# Patient Record
Sex: FEMALE | Race: WHITE | Employment: FULL TIME | URBAN - METROPOLITAN AREA
[De-identification: names, ages, dates, MRNs, and addresses within clinical notes are randomized per-mention and may not be internally consistent; named-entity substitution may affect disease eponyms.]

---

## 2022-03-12 ENCOUNTER — OFFICE VISIT (OUTPATIENT)
Dept: URGENT CARE | Facility: URGENT CARE | Age: 37
End: 2022-03-12

## 2022-03-12 VITALS
HEART RATE: 75 BPM | TEMPERATURE: 99.7 F | WEIGHT: 135 LBS | SYSTOLIC BLOOD PRESSURE: 151 MMHG | OXYGEN SATURATION: 99 % | DIASTOLIC BLOOD PRESSURE: 78 MMHG | RESPIRATION RATE: 16 BRPM

## 2022-03-12 DIAGNOSIS — S61.214A LACERATION OF RIGHT RING FINGER WITHOUT FOREIGN BODY WITHOUT DAMAGE TO NAIL, INITIAL ENCOUNTER: Primary | ICD-10-CM

## 2022-03-12 PROCEDURE — 12001 RPR S/N/AX/GEN/TRNK 2.5CM/<: CPT | Performed by: INTERNAL MEDICINE

## 2022-03-13 NOTE — PATIENT INSTRUCTIONS
After care instructions:  Keep wound clean and dry for the next 24-48 hours  Signs of infection discussed today  Apply anti-bacterial ointment for 2 days  Any concerns of tendon movement then see hand specialist    Sutures out in 10-14 days      Patient Education     Laceration of an Arm or Leg: Stitches, Staples, or Tape   A laceration is a cut through the skin. If it's deep or it's gaping open, it may require stitches or staples to close so it can heal. Minor cuts may be treated with surgical tape closures, or skin glue.   X-rays may be done if something may have entered the skin through the cut. You may also need a tetanus shot if you are not up to date on this vaccine.   Home care    Follow the healthcare provider s instructions on how to care for the cut.    Wash your hands with soap and clean, running water before and after caring for your wound. This is to help prevent infection.    Keep the wound clean and dry. If a bandage was applied and it becomes wet or dirty, replace it. Otherwise, leave it in place for the first 24 hours, then change it once a day or as directed.    If stitches or staples were used, clean the wound daily:  ? After removing the bandage, wash the area with soap and water. Use a wet cotton swab to loosen and remove any blood or crust that forms.  ? After cleaning, keep the wound clean and dry. Talk with your healthcare provider before putting any antibiotic ointment on the wound. Reapply the bandage.    Remove the bandage to shower as usual after the first 24 hours, but don't soak the area in water (no swimming) until the stitches or staples are removed.    If surgical tape closures were used, keep the area clean and dry. If it becomes wet, blot it dry with a towel. Let the surgical tape fall off on its own.    Follow the healthcare provider's instructions for any medicines prescribed.  ? The provider may prescribe an antibiotic cream or ointment to prevent infection. He or she may also  prescribe an antibiotic pill. Don't stop taking this medicine until you have finished it all or the provider tells you to stop.  ? The provider may also prescribe medicine for pain. Follow the instructions exactly for how to take these medicines.    Don't do activities that may reopen your wound.    Follow-up care  Follow up with your healthcare provider, or as advised. Most skin wounds heal within 10 days. But an infection may sometimes occur even with proper treatment. Check the wound daily for the signs of infection listed below. Stitches and staples should be removed within 7 to14 days. If surgical tape closures were used, you may remove them after 10 days if they have not fallen off by then.    When to seek medical advice  Call your healthcare provider right away if any of these occur:    Wound bleeding not controlled by direct pressure    Signs of infection, including increasing pain in the wound, increasing wound redness or swelling, or pus or bad odor coming from the wound    Chills, fever of 100.4 F (38 C) or higher, or as directed by your healthcare provider    Stitches or staples coming apart or falling out or surgical tape falling off before 7 days    Wound edges reopening    Color changes in the wound    Numbness around the wound     Decreased movement around the injured area  [facility] last reviewed this educational content on 6/1/2020 2000-2021 [employer]. All rights reserved. This information is not intended as a substitute for professional medical care. Always follow your healthcare professional's instructions.

## 2022-03-13 NOTE — PROGRESS NOTES
SUBJECTIVE:     Chief Complaint   Patient presents with     Laceration     Cut right ring finger with a knife about an hour ago.     Madina Thapa is a 36 year old female who presents to the clinic with a laceration on the right finger sustained 1 hour(s) ago.  This is a non-work related injury.    Mechanism of injury: knife.    Associated symptoms: Denies  weakness, or loss of function  Has numbness,  Last tetanus booster within 10 years: yes  Td/Tdap  02/11/2022  Booster   Full          EXAM:   The patient appears today in alert,no apparent distress distress  VITALS: BP (!) 151/78 (BP Location: Right arm, Patient Position: Sitting, Cuff Size: Adult Regular)   Pulse 75   Temp 99.7  F (37.6  C) (Tympanic)   Resp 16   Wt 61.2 kg (135 lb)   SpO2 99%   Breastfeeding No     Size of laceration: 2 centimeters  Characteristics of the laceration: bleeding- mild, clean, jagged and longitudinal  Tendon function intact: yes  Sensation to light touch intact: yes    Picture included in patient's chart: yes            Assessment:  Laceration of right ring finger without foreign body without damage to nail, initial encounter    PLAN:  PROCEDURE NOTE::  Wound was locally injected with 2 cc's of Lidocaine 1% with epinephrine  Digital/regional block applied using a total of 3 cc's of Lidocaine 1% with epinephrine  Good anesthesia was obtained  Wound cleaned with HIBICLENS  Wound soaked  Laceration was closed using 5 5-0 sutures interrupted sutures  After care instructions:  Keep wound clean and dry for the next 24-48 hours  Signs of infection discussed today  Apply anti-bacterial ointment for 2 days